# Patient Record
Sex: FEMALE | Race: OTHER
[De-identification: names, ages, dates, MRNs, and addresses within clinical notes are randomized per-mention and may not be internally consistent; named-entity substitution may affect disease eponyms.]

---

## 2019-09-04 ENCOUNTER — APPOINTMENT (OUTPATIENT)
Dept: PULMONOLOGY | Facility: CLINIC | Age: 79
End: 2019-09-04

## 2019-09-25 ENCOUNTER — APPOINTMENT (OUTPATIENT)
Dept: PULMONOLOGY | Facility: CLINIC | Age: 79
End: 2019-09-25

## 2019-12-17 ENCOUNTER — APPOINTMENT (OUTPATIENT)
Dept: PULMONOLOGY | Facility: CLINIC | Age: 79
End: 2019-12-17
Payer: MEDICARE

## 2019-12-17 VITALS
WEIGHT: 128 LBS | DIASTOLIC BLOOD PRESSURE: 61 MMHG | RESPIRATION RATE: 16 BRPM | BODY MASS INDEX: 25 KG/M2 | HEART RATE: 79 BPM | SYSTOLIC BLOOD PRESSURE: 132 MMHG | OXYGEN SATURATION: 96 %

## 2019-12-17 PROCEDURE — 99214 OFFICE O/P EST MOD 30 MIN: CPT

## 2019-12-17 NOTE — REVIEW OF SYSTEMS
[Fever] : no fever [Chills] : no chills [Sinus Problems] : no sinus problems [Nasal Congestion] : no nasal congestion [Cough] : cough [Sputum] : not coughing up ~M sputum [Dyspnea] : no dyspnea [Hypotension] : no hypotension [Hay Fever] : no hay fever

## 2019-12-17 NOTE — PHYSICAL EXAM
[General Appearance - Well Developed] : well developed [General Appearance - Well Nourished] : well nourished [No Deformities] : no deformities [Normal Conjunctiva] : the conjunctiva exhibited no abnormalities [Neck Appearance] : the appearance of the neck was normal [Neck Cervical Mass (___cm)] : no neck mass was observed [Heart Rate And Rhythm] : heart rate and rhythm were normal [Heart Sounds] : normal S1 and S2 [Auscultation Breath Sounds / Voice Sounds] : lungs were clear to auscultation bilaterally [Abdomen Tenderness] : non-tender [] : no hepato-splenomegaly [Abnormal Walk] : normal gait [Motor Exam] : the motor exam was normal

## 2019-12-17 NOTE — HISTORY OF PRESENT ILLNESS
[FreeTextEntry1] : 79 year old woman brought in by her son for follow up.  She has had  a LLL lung nodule that was being monitored in 2890-8486.  It appeared to show mild growth. It was most likely benign, however,  PET scan revealed SUV activity 2.6. A biopsy  was recommended but declined. \par \par She has not had any pulmonary exacerbation in meantime My recommendation was communicated to her family and to her. Her family has discussed the issue and would like to defer the biopsy. 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 \par She underwent CXR at this time with apparent opacity in right mid lung zone\par \par She has less cough, states breathing better.\par \par SH\par smoked briefly  but exposed to indoor air pollution in past.

## 2019-12-17 NOTE — DISCUSSION/SUMMARY
[FreeTextEntry1] : Lung nodule LLL,now with abnormal CXR\par \par I will order NC CT chest to better evaluate, rule out slow growing malignancy or other process.\par \par [facility]

## 2020-01-29 ENCOUNTER — APPOINTMENT (OUTPATIENT)
Dept: PULMONOLOGY | Facility: CLINIC | Age: 80
End: 2020-01-29
Payer: MEDICARE

## 2020-01-29 VITALS
RESPIRATION RATE: 16 BRPM | OXYGEN SATURATION: 94 % | SYSTOLIC BLOOD PRESSURE: 112 MMHG | TEMPERATURE: 98.2 F | DIASTOLIC BLOOD PRESSURE: 57 MMHG | WEIGHT: 138 LBS | HEART RATE: 80 BPM | BODY MASS INDEX: 26.95 KG/M2

## 2020-01-29 PROCEDURE — 99214 OFFICE O/P EST MOD 30 MIN: CPT

## 2020-01-30 NOTE — PHYSICAL EXAM
[No Acute Distress] : no acute distress [Normal Appearance] : normal appearance [No Neck Mass] : no neck mass [Normal Oropharynx] : normal oropharynx [Normal Rate/Rhythm] : normal rate/rhythm [No Murmurs] : no murmurs [Normal S1, S2] : normal s1, s2 [Benign] : benign [No HSM] : no hsm [No Resp Distress] : no resp distress [No Hernias] : no hernias [TextBox_68] : few rhonchi, fairly clear

## 2020-01-30 NOTE — DISCUSSION/SUMMARY
[FreeTextEntry1] : History of pulmonary nodules.  Repeat CT chest December 24, 2019 reveals no change in nodules consistent with benign etiology.  No further CT is needed for these nodules\par \par [facility] \par \par

## 2020-01-30 NOTE — REVIEW OF SYSTEMS
[Nasal Congestion] : no nasal congestion [Fever] : no fever [Dyspnea] : no dyspnea [Hay Fever] : no hay fever [Chest Discomfort] : no chest discomfort

## 2020-01-30 NOTE — HISTORY OF PRESENT ILLNESS
[TextBox_4] : 80 year old woman brought in by her son for follow up. She has had a LLL lung nodule that was being monitored in 7236-7373. It appeared to show mild growth. It was most likely benign, however, PET scan revealed SUV activity 2.6. A biopsy was recommended but declined. \par \par She has not had any pulmonary exacerbation in meantime.\par Repeat CT chest has been performed now, December 24, 2019.  It demonstrated stability of nodules with no new suspicious findings.  Chronic scarring of right lung noted, which accounts for earlier chest x ray finding. \par She has no dyspnea or wheeze.\par She has had the influenza vaccine this season. \par \par She has less cough, states breathing better.\par \par SH\par smoked briefly but exposed to indoor air pollution in past. \par

## 2020-07-29 ENCOUNTER — APPOINTMENT (OUTPATIENT)
Dept: PULMONOLOGY | Facility: CLINIC | Age: 80
End: 2020-07-29

## 2021-04-21 ENCOUNTER — APPOINTMENT (OUTPATIENT)
Dept: DISASTER EMERGENCY | Facility: OTHER | Age: 81
End: 2021-04-21
Payer: MEDICARE

## 2021-04-21 PROCEDURE — 0002A: CPT

## 2023-03-08 ENCOUNTER — APPOINTMENT (OUTPATIENT)
Dept: PULMONOLOGY | Facility: CLINIC | Age: 83
End: 2023-03-08

## 2023-04-17 ENCOUNTER — APPOINTMENT (OUTPATIENT)
Dept: PULMONOLOGY | Facility: CLINIC | Age: 83
End: 2023-04-17
Payer: MEDICARE

## 2023-04-17 VITALS
SYSTOLIC BLOOD PRESSURE: 130 MMHG | OXYGEN SATURATION: 96 % | DIASTOLIC BLOOD PRESSURE: 62 MMHG | TEMPERATURE: 97.3 F | BODY MASS INDEX: 29.3 KG/M2 | WEIGHT: 150 LBS | HEART RATE: 78 BPM

## 2023-04-17 DIAGNOSIS — J84.9 INTERSTITIAL PULMONARY DISEASE, UNSPECIFIED: ICD-10-CM

## 2023-04-17 PROCEDURE — 94729 DIFFUSING CAPACITY: CPT

## 2023-04-17 PROCEDURE — 99215 OFFICE O/P EST HI 40 MIN: CPT | Mod: 25

## 2023-04-17 PROCEDURE — 94727 GAS DIL/WSHOT DETER LNG VOL: CPT

## 2023-04-17 NOTE — DISCUSSION/SUMMARY
[FreeTextEntry1] : History of pulmonary nodules.  Repeat CT chest December 24, 2019 reveals no change in nodules consistent with benign etiology.  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, asthma/COPD, HTN, hypothyroidism\par \par She follows with cardiology [facility] for PPM management and and Dr Roe JOSHI\par \par Bronchiectasis, oxygen dependent since second episode of COVID in 01/2023\par \par PFT demonstrated moderate obstruction on testing today\par \par No rales on examination\par \par PLAN\par \par NC CT Chest to better evaluate\par \par Start on Trelegy once per day ICS LAMA LABA\par \par May use albuterol MDI or nebulized albuterol as needed\par \par Follow with cardiology\par \par request records from [facility] cardiology and from [facility]\par \par order [employer], portable oxygen concentrator\par \par O2 sat RA 88%\par \par She will return for follow-up\par \par I will review outside medical history as it becomes available\par \par Plan discussed with her daughter who is present today\par \par Total time spent : 40 minutes\par Including:\par Preparation prior to visit - Reviewing prior record, results of tests and Consultation Reports as applicable\par Conducting an appropriate H & P during today's encounter\par \par reviewing all available CT chest exams.\par \par demonstrating images to pt as appropriate\par Counseling patient \par Note completion \par med renewal\par discussing differential diagnosis\par Malachi Dumont MD \par \par Malachi Dumont MD [facility] \par \par

## 2023-04-17 NOTE — HISTORY OF PRESENT ILLNESS
[Former] : former [Never] : never [TextBox_22] : hookah, indoor air pollution cooking [TextBox_4] : 83 year old woman brought in by her daughter for follow up. She has had a LLL lung nodule that was being monitored in 1329-2525. It appeared to show mild growth. It was most likely benign, however, PET scan revealed SUV activity 2.6. A biopsy was recommended but declined. \par \par Repeat CT chest has been performed December 24, 2019.  It demonstrated stability of nodules with no new suspicious findings.  Chronic scarring of right lung noted, which accounts for earlier chest x ray finding.\par \par She was last seen in January 2020\par \par She has had COVID-19 infection, initially in 2020 treated at home.  daughter report the patient was ill but did not become hospitalized. \par \par She was hospitalized at [facility] with pneumonia according to her daughter .information obtained from [facility], \par As per [email or web address], due to significant past medical history of asthma/COPD,\par initial work up included rule out asthma/COPD exacerbation. Patient was started\par on ceftriaxone and azithromycin, as well IV solumedrol tapering. Pulmonology\par was consulted, and followed patient throughout clinical course. Initially\par patient was on 4-5L NC, saturating well. Without oxygen patient became hypoxic\par with O2 saturatioon <88%. Based on imaging, pulmonary embolism was worked up\par and was found to be negative with CTPE study. Patient was worked up for\par tuberculosis rule out, which was negative x3 acid fast bacillus sputum. 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 Patient's oxygen status improved to where she required 2L NC.\par \par She had COVID-19 infection again 01/2023.  She has been using oxygen. \par \par She continues to have cough\par \par She uses oxygen 2 L/min.  In office O2 saturation is 88% on room air\par \par \par \par \par \par SH\par smoked briefly but exposed to indoor air pollution in past. \par  [Snoring] : no snoring [Unintentional Sleep while Active] : no unintentional sleep while active [Witnessed Apneas] : no witnessed apneas

## 2023-04-17 NOTE — PROCEDURE
[FreeTextEntry1] : PFT  demonstrates  mild moderate  obstructive ventilatory defect with diminished FEV1.   The FEV1/FVC ratio is diminished at  59 percent.\par \par There is no restriction demonstrated by normal TLC.  There is hyperinflation seen by elevated RV.\par \par The DLCO is diminished\par \par Malachi Dumont MD

## 2023-04-17 NOTE — REASON FOR VISIT
[Follow-Up] : a follow-up visit [Pulmonary Nodules] : pulmonary nodules [Cough] : cough [TextBox_44] : Hypoxia, interstitial lung disease

## 2023-04-17 NOTE — PHYSICAL EXAM
[No Acute Distress] : no acute distress [Normal Oropharynx] : normal oropharynx [Normal Appearance] : normal appearance [No Neck Mass] : no neck mass [Normal Rate/Rhythm] : normal rate/rhythm [Normal S1, S2] : normal s1, s2 [No Murmurs] : no murmurs [No Resp Distress] : no resp distress [Benign] : benign [No HSM] : no hsm [No Hernias] : no hernias [TextBox_68] : few rhonchi, fairly clear

## 2023-04-17 NOTE — REVIEW OF SYSTEMS
[Fever] : no fever [Nasal Congestion] : no nasal congestion [Dyspnea] : no dyspnea [Chest Discomfort] : no chest discomfort [Hay Fever] : no hay fever

## 2023-04-25 ENCOUNTER — APPOINTMENT (OUTPATIENT)
Dept: CT IMAGING | Facility: IMAGING CENTER | Age: 83
End: 2023-04-25
Payer: MEDICARE

## 2023-04-25 ENCOUNTER — OUTPATIENT (OUTPATIENT)
Dept: OUTPATIENT SERVICES | Facility: HOSPITAL | Age: 83
LOS: 1 days | End: 2023-04-25
Payer: MEDICARE

## 2023-04-25 DIAGNOSIS — J47.9 BRONCHIECTASIS, UNCOMPLICATED: ICD-10-CM

## 2023-04-25 PROCEDURE — 71250 CT THORAX DX C-: CPT

## 2023-04-25 PROCEDURE — 71250 CT THORAX DX C-: CPT | Mod: 26,MH

## 2023-05-09 ENCOUNTER — LABORATORY RESULT (OUTPATIENT)
Age: 83
End: 2023-05-09

## 2023-05-09 ENCOUNTER — APPOINTMENT (OUTPATIENT)
Dept: PULMONOLOGY | Facility: CLINIC | Age: 83
End: 2023-05-09
Payer: MEDICARE

## 2023-05-09 VITALS
WEIGHT: 146 LBS | DIASTOLIC BLOOD PRESSURE: 64 MMHG | HEART RATE: 87 BPM | BODY MASS INDEX: 28.51 KG/M2 | TEMPERATURE: 96 F | OXYGEN SATURATION: 97 % | SYSTOLIC BLOOD PRESSURE: 124 MMHG

## 2023-05-09 DIAGNOSIS — R91.1 SOLITARY PULMONARY NODULE: ICD-10-CM

## 2023-05-09 DIAGNOSIS — Z22.7 LATENT TUBERCULOSIS: ICD-10-CM

## 2023-05-09 DIAGNOSIS — J44.9 CHRONIC OBSTRUCTIVE PULMONARY DISEASE, UNSPECIFIED: ICD-10-CM

## 2023-05-09 DIAGNOSIS — J47.9 BRONCHIECTASIS, UNCOMPLICATED: ICD-10-CM

## 2023-05-09 DIAGNOSIS — J96.11 CHRONIC RESPIRATORY FAILURE WITH HYPOXIA: ICD-10-CM

## 2023-05-09 PROCEDURE — 99214 OFFICE O/P EST MOD 30 MIN: CPT

## 2023-05-09 RX ORDER — ALBUTEROL SULFATE 90 UG/1
108 (90 BASE) INHALANT RESPIRATORY (INHALATION)
Qty: 1 | Refills: 5 | Status: ACTIVE | COMMUNITY
Start: 2023-04-17 | End: 1900-01-01

## 2023-05-09 RX ORDER — FLUTICASONE FUROATE, UMECLIDINIUM BROMIDE AND VILANTEROL TRIFENATATE 100; 62.5; 25 UG/1; UG/1; UG/1
100-62.5-25 POWDER RESPIRATORY (INHALATION)
Qty: 1 | Refills: 0 | Status: ACTIVE | COMMUNITY
Start: 2023-04-17 | End: 1900-01-01

## 2023-05-09 RX ORDER — ALBUTEROL SULFATE 0.63 MG/3ML
0.63 SOLUTION RESPIRATORY (INHALATION) 3 TIMES DAILY
Qty: 3 | Refills: 1 | Status: ACTIVE | COMMUNITY
Start: 2023-04-17 | End: 1900-01-01

## 2023-05-09 RX ORDER — GUAIFENESIN 400 MG/1
400 TABLET ORAL
Qty: 20 | Refills: 0 | Status: ACTIVE | COMMUNITY
Start: 2023-05-09 | End: 1900-01-01

## 2023-05-09 RX ORDER — FEXOFENADINE HYDROCHLORIDE 180 MG/1
180 TABLET ORAL DAILY
Qty: 60 | Refills: 1 | Status: ACTIVE | COMMUNITY
Start: 2023-05-09 | End: 1900-01-01

## 2023-05-09 NOTE — HISTORY OF PRESENT ILLNESS
[Former] : former [Never] : never [TextBox_4] : 83 year old woman brought in by her daughter for follow up. She has had a LLL lung nodule that was being monitored in 9327-2447. It appeared to show mild growth. It was most likely benign, however, PET scan revealed SUV activity 2.6. A biopsy was recommended but declined. \par \par Repeat CT chest has been performed December 24, 2019.  It demonstrated stability of nodules with no new suspicious findings.  Chronic scarring of right lung noted, which accounts for earlier chest x ray finding.\par \par She was last seen in January 2020\par \par She has had COVID-19 infection, initially in 2020 treated at home.  daughter report the patient was ill but did not become hospitalized. \par \par She was hospitalized at [facility] with pneumonia according to her daughter .information obtained from [employer], \par As per [employer], due to significant past medical history of asthma/COPD,\par initial work up included rule out asthma/COPD exacerbation. Patient was started\par on ceftriaxone and azithromycin, as well IV solumedrol tapering. Pulmonology\par was consulted, and followed patient throughout clinical course. Initially\par patient was on 4-5L NC, saturating well. Without oxygen patient became hypoxic\par with O2 saturatioon <88%. Based on imaging, pulmonary embolism was worked up\par and was found to be negative with CTPE study. Patient was worked up for\par tuberculosis rule out, which was negative x3 acid fast bacillus sputum. 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 Patient's oxygen status improved to where she required 2L NC.\par \par She had COVID-19 infection again 01/2023.  She has been using oxygen. \par \par She continues to have cough\par \par She uses oxygen 2 L/min.  In office O2 saturation is 88% on room air\par \par \par \par \par \par SH\par smoked briefly but exposed to indoor air pollution in past. \par  [TextBox_22] : hookah, indoor air pollution cooking [Snoring] : no snoring [Unintentional Sleep while Active] : no unintentional sleep while active [Witnessed Apneas] : no witnessed apneas

## 2023-05-09 NOTE — REASON FOR VISIT
[Follow-Up] : a follow-up visit [Cough] : cough [Pulmonary Nodules] : pulmonary nodules [TextBox_44] : Hypoxia, interstitial lung disease

## 2023-05-09 NOTE — DISCUSSION/SUMMARY
[FreeTextEntry1] : History of pulmonary nodules.  Repeat CT chest December 24, 2019 reveals no change in nodules consistent with benign etiology. 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 s/p PPM, asthma/COPD, HTN, hypothyroidism\par \par She follows with cardiology [facility] for PPM management and and Dr Roe JOSHI.\par \par Bronchiectasis, oxygen dependent since second episode of COVID in 01/2023\par \par PFT demonstrated moderate obstruction on testing today\par \par CT chest 4/25/23 demonstrated centrilobular emphysema stable RML atelectasis , 7 mm RUL nodule calcified mediastinal lymph nodes, small pleural effusions\par \par  history of LTBI, positive Quantiferon\par \par PLAN\par \par Obtain blood work to test for allergies, possible connective tissue disease, hypersensitivity\par \par treat seasonal allergy with fexofenadine\par \par expectorant\par Remains on Trelegy once per day ICS LAMA LABA\par \par will use nebulized albuterol daily\par \par monitor sputum\par \par \par Follow with cardiology, to address pleural effusions and pedal edema\par \par order [employer], portable oxygen concentrator\par \par \par \par I will review outside medical history as it becomes available\par \par Plan discussed with her daughter who is present today\par \par Total time spent :30 minutes\par Including:\par Preparation prior to visit - Reviewing prior record, results of tests and Consultation Reports as applicable\par Conducting an appropriate H & P during today's encounter\par \par reviewing all available CT chest exams.\par \par demonstrating images to pt as appropriate\par Counseling patient \par Note completion \par med renewal\par discussing differential diagnosis\par Malachi Dumont MD \par \par Malachi Dumont MD [facility] \par \par

## 2023-05-10 LAB
ALBUMIN SERPL ELPH-MCNC: 4.5 G/DL
ALP BLD-CCNC: 66 U/L
ALT SERPL-CCNC: 37 U/L
ANION GAP SERPL CALC-SCNC: 13 MMOL/L
AST SERPL-CCNC: 63 U/L
BASOPHILS # BLD AUTO: 0.05 K/UL
BASOPHILS NFR BLD AUTO: 0.8 %
BILIRUB SERPL-MCNC: 0.3 MG/DL
BUN SERPL-MCNC: 14 MG/DL
C3 SERPL-MCNC: 150 MG/DL
C4 SERPL-MCNC: 25 MG/DL
CALCIUM SERPL-MCNC: 9.3 MG/DL
CCP AB SER IA-ACNC: <8 UNITS
CHLORIDE SERPL-SCNC: 98 MMOL/L
CO2 SERPL-SCNC: 25 MMOL/L
CREAT SERPL-MCNC: 0.89 MG/DL
EGFR: 65 ML/MIN/1.73M2
EOSINOPHIL # BLD AUTO: 0.16 K/UL
EOSINOPHIL NFR BLD AUTO: 2.5 %
GLUCOSE SERPL-MCNC: 202 MG/DL
HCT VFR BLD CALC: 38.4 %
HGB BLD-MCNC: 12.4 G/DL
IMM GRANULOCYTES NFR BLD AUTO: 0.2 %
LYMPHOCYTES # BLD AUTO: 2.76 K/UL
LYMPHOCYTES NFR BLD AUTO: 43 %
MAN DIFF?: NORMAL
MCHC RBC-ENTMCNC: 30.2 PG
MCHC RBC-ENTMCNC: 32.3 GM/DL
MCV RBC AUTO: 93.4 FL
MONOCYTES # BLD AUTO: 0.47 K/UL
MONOCYTES NFR BLD AUTO: 7.3 %
NEUTROPHILS # BLD AUTO: 2.97 K/UL
NEUTROPHILS NFR BLD AUTO: 46.2 %
PLATELET # BLD AUTO: 173 K/UL
POTASSIUM SERPL-SCNC: 4.5 MMOL/L
PROT SERPL-MCNC: 7.7 G/DL
RBC # BLD: 4.11 M/UL
RBC # FLD: 13.1 %
RF+CCP IGG SER-IMP: NEGATIVE
SODIUM SERPL-SCNC: 136 MMOL/L
WBC # FLD AUTO: 6.42 K/UL

## 2023-05-11 LAB
A ALTERNATA IGE QN: <0.1 KUA/L
A FUMIGATUS IGE QN: <0.1 KUA/L
ALTERN TENCAPG(M6): 2.5 MCG/ML
ASPER FUMCAPG(M3): 3 MCG/ML
AUREOBASCAPG(M12): 7.2 MCG/ML
BERMUDA GRASS IGE QN: <0.1 KUA/L
BOXELDER IGE QN: 0.16 KUA/L
C HERBARUM IGE QN: <0.1 KUA/L
CALIF WALNUT IGE QN: <0.1 KUA/L
CAT DANDER IGE QN: <0.1 KUA/L
CMN PIGWEED IGE QN: <0.1 KUA/L
COMMON RAGWEED IGE QN: <0.1 KUA/L
COTTONWOOD IGE QN: <0.1 KUA/L
D FARINAE IGE QN: 24.5 KUA/L
D PTERONYSS IGE QN: 11.8 KUA/L
DEPRECATED A ALTERNATA IGE RAST QL: 0
DEPRECATED A FUMIGATUS IGE RAST QL: 0
DEPRECATED BERMUDA GRASS IGE RAST QL: 0
DEPRECATED BOXELDER IGE RAST QL: NORMAL
DEPRECATED C HERBARUM IGE RAST QL: 0
DEPRECATED CAT DANDER IGE RAST QL: 0
DEPRECATED COMMON PIGWEED IGE RAST QL: 0
DEPRECATED COMMON RAGWEED IGE RAST QL: 0
DEPRECATED COTTONWOOD IGE RAST QL: 0
DEPRECATED D FARINAE IGE RAST QL: 4
DEPRECATED D PTERONYSS IGE RAST QL: 3
DEPRECATED DOG DANDER IGE RAST QL: 0
DEPRECATED GOOSEFOOT IGE RAST QL: NORMAL
DEPRECATED LONDON PLANE IGE RAST QL: 0
DEPRECATED MOUSE URINE PROT IGE RAST QL: 0
DEPRECATED MUGWORT IGE RAST QL: 0
DEPRECATED P NOTATUM IGE RAST QL: 0
DEPRECATED RED CEDAR IGE RAST QL: NORMAL
DEPRECATED ROACH IGE RAST QL: 3
DEPRECATED SHEEP SORREL IGE RAST QL: 0
DEPRECATED SILVER BIRCH IGE RAST QL: 0
DEPRECATED TIMOTHY IGE RAST QL: 0
DEPRECATED WHITE ASH IGE RAST QL: 0
DEPRECATED WHITE OAK IGE RAST QL: 0
DOG DANDER IGE QN: <0.1 KUA/L
GOOSEFOOT IGE QN: 0.12 KUA/L
LACEYELLA SACCHARI IGG: 2.1 MCG/ML
LONDON PLANE IGE QN: <0.1 KUA/L
MICROPOLYCAPG(M22): <2 MCG/ML
MOUSE URINE PROT IGE QN: <0.1 KUA/L
MUGWORT IGE QN: <0.1 KUA/L
MULBERRY (T70) CLASS: 0
MULBERRY (T70) CONC: <0.1 KUA/L
P NOTATUM IGE QN: <0.1 KUA/L
PENIC CHRYCAPG(M1): 6.7 MCG/ML
PHOMA BETAE IGG: 3.1 MCG/ML
RED CEDAR IGE QN: 0.12 KUA/L
ROACH IGE QN: 3.56 KUA/L
SHEEP SORREL IGE QN: <0.1 KUA/L
SILVER BIRCH IGE QN: <0.1 KUA/L
TIMOTHY IGE QN: <0.1 KUA/L
TOTAL IGE SMQN RAST: 545 KU/L
TREE ALLERG MIX1 IGE QL: 0
TRICHODERMA VIRIDE IGG: 6 MCG/ML
WHITE ASH IGE QN: <0.1 KUA/L
WHITE ELM IGE QN: 0
WHITE ELM IGE QN: <0.1 KUA/L
WHITE OAK IGE QN: <0.1 KUA/L

## 2023-05-12 LAB — DSDNA AB SER-ACNC: <12 IU/ML

## 2023-05-16 ENCOUNTER — NON-APPOINTMENT (OUTPATIENT)
Age: 83
End: 2023-05-16

## 2023-07-11 ENCOUNTER — APPOINTMENT (OUTPATIENT)
Dept: PULMONOLOGY | Facility: CLINIC | Age: 83
End: 2023-07-11

## 2024-11-20 ENCOUNTER — APPOINTMENT (OUTPATIENT)
Dept: PULMONOLOGY | Facility: CLINIC | Age: 84
End: 2024-11-20
Payer: MEDICARE

## 2024-11-20 VITALS
WEIGHT: 141 LBS | SYSTOLIC BLOOD PRESSURE: 116 MMHG | TEMPERATURE: 97.6 F | OXYGEN SATURATION: 94 % | HEART RATE: 85 BPM | BODY MASS INDEX: 27.54 KG/M2 | DIASTOLIC BLOOD PRESSURE: 56 MMHG

## 2024-11-20 DIAGNOSIS — J44.9 CHRONIC OBSTRUCTIVE PULMONARY DISEASE, UNSPECIFIED: ICD-10-CM

## 2024-11-20 DIAGNOSIS — J18.9 PNEUMONIA, UNSPECIFIED ORGANISM: ICD-10-CM

## 2024-11-20 DIAGNOSIS — J47.9 BRONCHIECTASIS, UNCOMPLICATED: ICD-10-CM

## 2024-11-20 DIAGNOSIS — R91.1 SOLITARY PULMONARY NODULE: ICD-10-CM

## 2024-11-20 DIAGNOSIS — J96.11 CHRONIC RESPIRATORY FAILURE WITH HYPOXIA: ICD-10-CM

## 2024-11-20 PROCEDURE — G2211 COMPLEX E/M VISIT ADD ON: CPT

## 2024-11-20 PROCEDURE — 99214 OFFICE O/P EST MOD 30 MIN: CPT

## 2024-12-24 ENCOUNTER — APPOINTMENT (OUTPATIENT)
Dept: PULMONOLOGY | Facility: CLINIC | Age: 84
End: 2024-12-24